# Patient Record
Sex: MALE | Race: WHITE | NOT HISPANIC OR LATINO | Employment: OTHER | URBAN - METROPOLITAN AREA
[De-identification: names, ages, dates, MRNs, and addresses within clinical notes are randomized per-mention and may not be internally consistent; named-entity substitution may affect disease eponyms.]

---

## 2018-12-27 ENCOUNTER — CONVERSION ENCOUNTER (OUTPATIENT)
Dept: FAMILY MEDICINE CLINIC | Facility: CLINIC | Age: 68
End: 2018-12-27

## 2018-12-27 ENCOUNTER — OFFICE VISIT CONVERTED (OUTPATIENT)
Dept: FAMILY MEDICINE CLINIC | Facility: CLINIC | Age: 68
End: 2018-12-27
Attending: NURSE PRACTITIONER

## 2019-04-09 ENCOUNTER — HOSPITAL ENCOUNTER (OUTPATIENT)
Dept: SLEEP MEDICINE | Facility: HOSPITAL | Age: 69
Discharge: HOME | End: 2019-04-09
Attending: PSYCHIATRY & NEUROLOGY

## 2019-05-29 ENCOUNTER — CONVERSION ENCOUNTER (OUTPATIENT)
Dept: FAMILY MEDICINE CLINIC | Facility: CLINIC | Age: 69
End: 2019-05-29

## 2019-05-29 ENCOUNTER — OFFICE VISIT CONVERTED (OUTPATIENT)
Dept: FAMILY MEDICINE CLINIC | Facility: CLINIC | Age: 69
End: 2019-05-29
Attending: FAMILY MEDICINE

## 2019-06-27 ENCOUNTER — HOSPITAL ENCOUNTER (OUTPATIENT)
Dept: OTHER | Facility: HOSPITAL | Age: 69
Discharge: HOME | End: 2019-06-27
Attending: FAMILY MEDICINE

## 2019-06-27 LAB
CHOLEST SERPL-MCNC: 183 MG/DL (ref 107–200)
CHOLEST/HDLC SERPL: 3.5 {RATIO} (ref 3–6)
HDLC SERPL-MCNC: 52 MG/DL (ref 40–60)
LDLC SERPL CALC-MCNC: 99 MG/DL (ref 70–100)
TRIGL SERPL-MCNC: 159 MG/DL (ref 40–150)
VLDLC SERPL-MCNC: 32 MG/DL (ref 5–37)

## 2019-06-28 LAB — HCV AB S/CO SERPL IA: 0.1 S/CO RATIO (ref 0–0.9)

## 2019-10-16 ENCOUNTER — OFFICE VISIT CONVERTED (OUTPATIENT)
Dept: FAMILY MEDICINE CLINIC | Facility: CLINIC | Age: 69
End: 2019-10-16
Attending: NURSE PRACTITIONER

## 2019-10-16 ENCOUNTER — CONVERSION ENCOUNTER (OUTPATIENT)
Dept: FAMILY MEDICINE CLINIC | Facility: CLINIC | Age: 69
End: 2019-10-16

## 2019-12-12 ENCOUNTER — CONVERSION ENCOUNTER (OUTPATIENT)
Dept: FAMILY MEDICINE CLINIC | Facility: CLINIC | Age: 69
End: 2019-12-12

## 2019-12-12 ENCOUNTER — OFFICE VISIT CONVERTED (OUTPATIENT)
Dept: FAMILY MEDICINE CLINIC | Facility: CLINIC | Age: 69
End: 2019-12-12
Attending: FAMILY MEDICINE

## 2020-06-17 ENCOUNTER — OFFICE VISIT CONVERTED (OUTPATIENT)
Dept: FAMILY MEDICINE CLINIC | Facility: CLINIC | Age: 70
End: 2020-06-17
Attending: FAMILY MEDICINE

## 2020-06-17 ENCOUNTER — CONVERSION ENCOUNTER (OUTPATIENT)
Dept: FAMILY MEDICINE CLINIC | Facility: CLINIC | Age: 70
End: 2020-06-17

## 2021-02-01 ENCOUNTER — CONVERSION ENCOUNTER (OUTPATIENT)
Dept: GASTROENTEROLOGY | Facility: CLINIC | Age: 71
End: 2021-02-01
Attending: INTERNAL MEDICINE

## 2021-03-12 ENCOUNTER — HOSPITAL ENCOUNTER (OUTPATIENT)
Dept: GASTROENTEROLOGY | Facility: HOSPITAL | Age: 71
Setting detail: HOSPITAL OUTPATIENT SURGERY
Discharge: HOME | End: 2021-03-12
Attending: INTERNAL MEDICINE

## 2021-04-20 ENCOUNTER — HOSPITAL ENCOUNTER (OUTPATIENT)
Dept: FAMILY MEDICINE CLINIC | Facility: CLINIC | Age: 71
Discharge: HOME | End: 2021-04-20
Attending: FAMILY MEDICINE

## 2021-04-20 ENCOUNTER — OFFICE VISIT CONVERTED (OUTPATIENT)
Dept: FAMILY MEDICINE CLINIC | Facility: CLINIC | Age: 71
End: 2021-04-20
Attending: FAMILY MEDICINE

## 2021-04-20 LAB
ALBUMIN SERPL-MCNC: 4.1 G/DL (ref 3.5–5)
ALBUMIN/GLOB SERPL: 1.4 {RATIO} (ref 1.4–2.6)
ALP SERPL-CCNC: 119 U/L (ref 56–155)
ALT SERPL-CCNC: 35 U/L (ref 10–40)
ANION GAP SERPL CALC-SCNC: 13 MMOL/L (ref 8–19)
AST SERPL-CCNC: 26 U/L (ref 15–50)
BILIRUB SERPL-MCNC: 0.24 MG/DL (ref 0.2–1.3)
BUN SERPL-MCNC: 16 MG/DL (ref 5–25)
BUN/CREAT SERPL: 14 {RATIO} (ref 6–20)
CALCIUM SERPL-MCNC: 9.6 MG/DL (ref 8.7–10.4)
CHLORIDE SERPL-SCNC: 104 MMOL/L (ref 99–111)
CONV CO2: 27 MMOL/L (ref 22–32)
CONV TOTAL PROTEIN: 7.1 G/DL (ref 6.3–8.2)
CREAT UR-MCNC: 1.12 MG/DL (ref 0.7–1.2)
GFR SERPLBLD BASED ON 1.73 SQ M-ARVRAT: >60 ML/MIN/{1.73_M2}
GLOBULIN UR ELPH-MCNC: 3 G/DL (ref 2–3.5)
GLUCOSE SERPL-MCNC: 136 MG/DL (ref 70–99)
OSMOLALITY SERPL CALC.SUM OF ELEC: 293 MOSM/KG (ref 273–304)
POTASSIUM SERPL-SCNC: 4.1 MMOL/L (ref 3.5–5.3)
SODIUM SERPL-SCNC: 140 MMOL/L (ref 135–147)

## 2021-05-10 NOTE — H&P
History and Physical      Patient Name: Reji Quintero   Patient ID: [id number]   Sex: Male   YOB: 1950    Primary Care Provider: Serg Johnson III MD    Visit Date: February 1, 2021    Provider: Gordon Gomez MD   Location: [facility]   Location Address: [address] [place] [address]  [id number]   Location Phone: [phone]          Chief Complaint  · Surgical History and Physical  · Screening Colonoscopy      History Of Present Illness  NON-INPATIENT HISTORY AND PHYSICAL  Allergies: tetracycline   Chief Complaint/History of Present Illness: Positive cologaurd, No family history of colon cancer   Colon Recall: No   Failed Outpatient Treatment/Contraindications: N/A   Current Medications: aripiprazole 10 mg oral tablet, atorvastatin 20 mg oral tablet, bupropion HCl 150 mg oral tablet extended release 24 hr, clobetasol 0.05 % topical cream, lamotrigine 150 mg oral tablet, NuLYTELY with Flavor Packs 420 gram oral recon soln, and paroxetine HCl 25 mg oral tablet extended release 24 hr   Significant Past Medical History: Allergic rhinitis, chronic, Anxiety and depression, Bipolar 2 disorder, Depression, High cholesterol, Medial meniscus tear, right, subsequent encounter, Medication management, and Primary osteoarthritis of right knee   Significant Family Medical History: No family history of colon cancer   Significant Past Surgical History: *Metal Implant, Colonoscopy, elbow, Kidney, and [place] Tooth Extraction   Previous Colonoscopy: Yes YEAR: 2016 By Whom: ALL CARE PROVIDER NAME   Previous EGD: No   PHYSICAL EXAM:  Heart: Regular Rate and Rhythm   Lungs: Breathing Unlabored           Assessment  · Preoperative examination     V72.84/Z01.818  · Screening for colon cancer     V76.51/Z12.11  · Positive colorectal cancer screening using DNA-based stool test     787.7/R19.5      Plan  · Orders  o Consent for Colonoscopy with Possible Biopsy - Possible risks/complications,  benefits, and alternatives to surgical or invasive procedure have been explained to patient and/or legal guardian. -Patient has been evaluated and can tolerate anesthesia and/or sedation. Risks, benefits, and alternatives to anesthesia and sedation have been explained to patient and/or legal guardian. (39363) - V72.84/Z01.818, V76.51/Z12.11, 787.7/R19.5 - 03/12/2021  · Medications  o Medications have been Reconciled  o Transition of Care or Provider Policy  · Instructions  o ****Surgical Orders****  o ***************  o Outpatient  o ***************  o RISK AND BENEFITS:  o Possible risks/complications, benefits and alternatives to surgical or invasive procedure have been explained to the patient and/or legal guardian.  o Patient has been evaluated and can tolerate anesthesia and/or sedation. Risks, benefits, and alternatives to anesthesia and sedation have been explained to the patient and/or legal guardian.  o ***************  o PREP: Per protocol  o IV: Per Anesthesia  o The above History and Physical Examination has been completed within 30 days of admission.  o This note has been transcribed by PARI Duke. I have read and agree with the findings in this note.  o Electronically Identified Patient Education Materials Provided Electronically  · Disposition  o Call or Return if symptoms worsen or persist.            Electronically Signed by: Darcie Ty MA -Author on February 1, 2021 08:53:22 AM

## 2021-05-13 NOTE — PROGRESS NOTES
Progress Note      Patient Name: Reji Quintero   Patient ID: [id number]   Sex: Male   YOB: 1950    Primary Care Provider: Serg Johnson III MD    Visit Date: June 17, 2020    Provider: Serg Johnson III MD   Location: [facility]   Location Address: [address] [place] [address]  [id number]   Location Phone: [phone]          Chief Complaint  · yearly routine check up and lab work  · refill medication for depression, cholesterol and bipolar  · Adult General Male Physical Exam      History Of Present Illness  Reji Quintero is a 70 year old /White male who presents for evaluation and treatment of:      HPI     patient 70-year-old  is here for his 6-month follow-up.  The patient states he is not having problems    history of hypercholesterolemia  history of bipolar   history of overweight    Review of systems     cardiovascular no chest pain no palpitations patient not exercising needs to.  Respiratory no shortness of breath dyspnea exertion   had a colonoscopy 2010 needs a colonoscopy but recommended Dr. Bill Saldivar thank you  Skin no lesions.  Neurologic no focal losses.    Physical exam    blood pressure is 110/70, weight 236, temperature 98  General no distress  Respiratory no increased work of breathing lungs clear and equal bilateral wheezes  Cardiovascular regular rhythm normal no murmur  Abdomen soft nontender no paraspinal megaly rectal exam shows 1+ prostate hypertrophy no lesions.  Skin is negative.    Assessment     #1 hypercholesterolemia is not taking atorvastatin for 4 weeks needs to take it for a month and then will get his blood work sent and his PSA in office blood   #2 patient needs a colonoscopy he will schedule   #3 bipolar patient is doing well sees a psychiatrist.    Plan     increase exercise decrease weight get the colonoscopy.  And get his blood work in one month       Past Medical History  Disease Name Date Onset Notes    Allergic rhinitis, chronic --  --    Anxiety and depression 12/12/2019 --    Bipolar 2 disorder 12/12/2019 --    Depression --  --    High cholesterol --  --    Medial meniscus tear, right, subsequent encounter 06/01/2016 --    Medication management 12/12/2019 --    Primary osteoarthritis of right knee 05/19/2016 --          Past Surgical History  Procedure Name Date Notes   *Metal Implant --  --    Colonoscopy 2016 normal due 2026   elbow 2009 right   Kidney --  --    [place] Tooth Extraction 2006, 1966 --          Medication List  Name Date Started Instructions   aripiprazole 10 mg oral tablet 06/17/2020 TAKE 1 TABLET EVERY DAY   atorvastatin 20 mg oral tablet 06/17/2020 TAKE 1 TABLET EVERY DAY   bupropion HCl 150 mg oral tablet extended release 24 hr 06/17/2020 TAKE 1 TABLET EVERY DAY   clobetasol 0.05 % topical cream 10/16/2019 apply a thin layer to the affected area(s) by topical route 3 times per day   lamotrigine 150 mg oral tablet 06/17/2020 TAKE 1 TABLET TWICE DAILY   paroxetine HCl 25 mg oral tablet extended release 24 hr 06/17/2020 TAKE 1 TABLET EVERY DAY         Allergy List  Allergen Name Date Reaction Notes   tetracycline --  --  --        Allergies Reconciled  Family Medical History  Disease Name Relative/Age Notes   Heart Disease Father/   --    Diabetes, unspecified type Mother/   Mother   Renal Calculus Father/   Father   -Father's Family History Unknown Father/   Father   -Mother's Family History Unknown Mother/   Mother         Social History  Finding Status Start/Stop Quantity Notes   Active but no formal exercise --  --/-- --  --    Advance directive declined by patient --  --/-- --  --    Alcohol Current some day 0/0 --  drinks monthly; wine, beer and liquor   Caffeine Current every day 0/0 --  drinks regularly; tea and soft drinks; 3-4 times per day   Second hand smoke exposure Never 0/0 --  no   Tobacco Never 0/0 --  never a smoker         Immunizations  NameDate Admin Mfg Trade Name Lot  "Number Route Inj VIS Given VIS Publication   Mjwfvwsgm64/12/2019 [facility] Fluzone Quadrivalent [id number] IM LD 12/12/2019    Comments: ndc [id number]   Yydhjrkaa14/04/2017 [facility] Fluzone Quadrivalent [id number] IM  12/04/2017 08/07/2015   Comments:    Prevnar 1312/04/2017 [facility] PREVNAR 13 [id number] IM  12/04/2017 12/04/2017   Comments:          Vitals  Date Time BP Position Site L\R Cuff Size HR RR TEMP (F) WT  HT  BMI kg/m2 BSA m2 O2 Sat HC       06/17/2020 02:15 /70 Sitting      98.4 236lbs 0oz 5'  11\" 32.91 2.32               Results  · In-Office Procedures  o Lab procedure  § IOP - Urinalysis without Microscopy (Clinitek) [facility] ([id number])   § Color Ur: Yellow   § Clarity Ur: Clear   § Glucose Ur Ql Strip: Negative   § Bilirub Ur Ql Strip: Negative   § Ketones Ur Ql Strip: Negative   § Sp Gr Ur Qn: 1.025   § Hgb Ur Ql Strip: Negative   § pH Ur-LsCnc: 5.0   § Prot Ur Ql Strip: Negative   § Urobilinogen Ur Strip-mCnc: 0.2 E.U./dL   § Nitrite Ur Ql Strip: Negative   § WBC Est Ur Ql Strip: Negative       Assessment  · Screening for depression     V79.0/Z13.89  · Screening for colon cancer     V76.51/Z12.11  · Screening for prostate cancer     V76.44/Z12.5  · Encounter for screening for cardiovascular disorders     V81.2/Z13.6  · Fatigue     780.79/R53.83  · Anxiety and depression       Anxiety disorder, unspecified     300.00/F41.9  Major depressive disorder, single episode, unspecified     300.00/F32.9  · Bipolar 2 disorder     296.89/F31.81  · Medication management     V58.69/Z79.899  · Primary osteoarthritis of right knee     715.16/M17.11  · High cholesterol     272.0/E78.0  · General Medical Exam, Adult (CPE)     V70.0/Z00.00    Problems Reconciled  Plan  · Orders  o COLONOSCOPY REFERRAL (COLON) - V76.51/Z12.11 - 06/17/2020   last colonoscopy 7/15/2010 normal due 7/2020  o ACO - Pt declines to or was not able to provide an Advance Care Plan or name a Surrogate Decision Maker (1124F) - - 06/17/2020  o Male Physical Primary Care " Panel (CMP, CBC, TSH, Lipid, PSA) [facility] ([id number], 07766, 87518, 54613, ) - V58.69/Z79.899, V81.2/Z13.6, V76.44/Z12.5, 780.79/R53.83 - 07/17/2020  o ACO-39: Current medications updated and reviewed () - - 06/17/2020  o ACO-15: Pneumococcal Vaccine Administered or Previously Received (4040F) - - 06/17/2020  o ACO-14: Influenza immunization administered or previously received () - - 06/17/2020  o ACO-13: Fall Risk Screening with no falls in past year or only one fall without injury in the past year (1101F) - - 06/17/2020  · Medications  o aripiprazole 10 mg oral tablet   SIG: TAKE 1 TABLET EVERY DAY   DISP: (90) Tablet with 3 refills  Refilled on 06/17/2020     o atorvastatin 20 mg oral tablet   SIG: TAKE 1 TABLET EVERY DAY   DISP: (90) Tablet with 3 refills  Refilled on 06/17/2020     o bupropion HCl 150 mg oral tablet extended release 24 hr   SIG: TAKE 1 TABLET EVERY DAY   DISP: (90) Tablet with 3 refills  Refilled on 06/17/2020     o lamotrigine 150 mg oral tablet   SIG: TAKE 1 TABLET TWICE DAILY   DISP: (180) Tablet with 3 refills  Refilled on 06/17/2020     o paroxetine HCl 25 mg oral tablet extended release 24 hr   SIG: TAKE 1 TABLET EVERY DAY   DISP: (90) Tablet with 3 refills  Refilled on 06/17/2020     o Vitamin D2 50,000 unit oral capsule   SIG: take 1 capsule (50,000 unit) by oral route once weekly   DISP: (12) capsules with 0 refills  Discontinued on 06/17/2020     o Medications have been Reconciled  o Transition of Care or Provider Policy  · Instructions  o Depression Screen completed and scanned into the EMR under the designated folder within the patient's documents.  o Today's PHQ-9 result is _4__  o CMS (Medicare) estimates that one in six persons older than 65 suffers from depression and that depression in older patients occurs in 25% of those with other medical illnesses. US Preventative Services Task Force indicates that depression screening and support improves clinical outcomes in older  adults. This service is covered by Medicare once a year as part of helping maintain a healthy you!  o The provider screening met the required time of 15 minutes.  o Patient is taking medications as prescribed and doing well.   o Take all medications as prescribed/directed.  o Patient instructed/educated on their diet and exercise program.  o Patient was educated/instructed on their diagnosis, treatment and medications prior to discharge from the clinic today.  o Bring all medicines with their bottles to each office visit.  o Time spent with the patient was 30 minutes, more than 50% face to face.  o Chronic conditions reviewed and taken into consideration for today's treatment plan.  o Discussed Covid-19 precautions including, but not limited to, social distancing, avoid touching your face, and hand washing.             Electronically Signed by: Emelina Field, -Author on June 17, 2020 03:14:05 PM  Electronically Co-signed by: Serg Johnson III MD -Reviewer on June 17, 2020 05:27:16 PM

## 2021-05-14 VITALS
HEART RATE: 72 BPM | OXYGEN SATURATION: 94 % | WEIGHT: 236 LBS | HEIGHT: 71 IN | TEMPERATURE: 97.9 F | SYSTOLIC BLOOD PRESSURE: 130 MMHG | DIASTOLIC BLOOD PRESSURE: 70 MMHG | BODY MASS INDEX: 33.04 KG/M2
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
## 2021-05-15 VITALS
TEMPERATURE: 98.4 F | BODY MASS INDEX: 33.04 KG/M2 | SYSTOLIC BLOOD PRESSURE: 110 MMHG | DIASTOLIC BLOOD PRESSURE: 70 MMHG | WEIGHT: 236 LBS | HEIGHT: 71 IN

## 2021-05-15 VITALS
HEART RATE: 56 BPM | WEIGHT: 230 LBS | SYSTOLIC BLOOD PRESSURE: 132 MMHG | DIASTOLIC BLOOD PRESSURE: 77 MMHG | HEIGHT: 71 IN | BODY MASS INDEX: 32.2 KG/M2 | OXYGEN SATURATION: 97 %

## 2021-05-15 VITALS
DIASTOLIC BLOOD PRESSURE: 68 MMHG | HEIGHT: 71 IN | BODY MASS INDEX: 32.2 KG/M2 | SYSTOLIC BLOOD PRESSURE: 118 MMHG | WEIGHT: 230 LBS

## 2021-05-15 VITALS
BODY MASS INDEX: 33.6 KG/M2 | SYSTOLIC BLOOD PRESSURE: 130 MMHG | WEIGHT: 240 LBS | DIASTOLIC BLOOD PRESSURE: 60 MMHG | HEIGHT: 71 IN

## 2021-05-16 VITALS
RESPIRATION RATE: 16 BRPM | SYSTOLIC BLOOD PRESSURE: 132 MMHG | HEIGHT: 71 IN | BODY MASS INDEX: 32.76 KG/M2 | TEMPERATURE: 97.5 F | WEIGHT: 234 LBS | OXYGEN SATURATION: 97 % | HEART RATE: 64 BPM | DIASTOLIC BLOOD PRESSURE: 68 MMHG

## 2021-07-08 RX ORDER — PAROXETINE HYDROCHLORIDE HEMIHYDRATE 25 MG/1
TABLET, FILM COATED, EXTENDED RELEASE ORAL
Qty: 180 TABLET | Refills: 0 | Status: SHIPPED | OUTPATIENT
Start: 2021-07-08 | End: 2021-09-27

## 2021-07-14 ENCOUNTER — OFFICE VISIT (OUTPATIENT)
Dept: FAMILY MEDICINE CLINIC | Facility: CLINIC | Age: 71
End: 2021-07-14

## 2021-07-14 VITALS
WEIGHT: 224 LBS | DIASTOLIC BLOOD PRESSURE: 70 MMHG | HEART RATE: 73 BPM | TEMPERATURE: 98.1 F | HEIGHT: 71 IN | OXYGEN SATURATION: 95 % | BODY MASS INDEX: 31.36 KG/M2 | SYSTOLIC BLOOD PRESSURE: 98 MMHG

## 2021-07-14 DIAGNOSIS — F33.1 MAJOR DEPRESSIVE DISORDER, RECURRENT, MODERATE (HCC): ICD-10-CM

## 2021-07-14 DIAGNOSIS — Z79.899 MEDICATION MANAGEMENT: ICD-10-CM

## 2021-07-14 DIAGNOSIS — F32.A ANXIETY AND DEPRESSION: ICD-10-CM

## 2021-07-14 DIAGNOSIS — R40.0 SLEEPINESS: ICD-10-CM

## 2021-07-14 DIAGNOSIS — R26.9 GAIT ABNORMALITY: ICD-10-CM

## 2021-07-14 DIAGNOSIS — Z13.6 ENCOUNTER FOR LIPID SCREENING FOR CARDIOVASCULAR DISEASE: ICD-10-CM

## 2021-07-14 DIAGNOSIS — F41.9 ANXIETY AND DEPRESSION: ICD-10-CM

## 2021-07-14 DIAGNOSIS — E78.00 HIGH CHOLESTEROL: ICD-10-CM

## 2021-07-14 DIAGNOSIS — Z13.220 ENCOUNTER FOR LIPID SCREENING FOR CARDIOVASCULAR DISEASE: ICD-10-CM

## 2021-07-14 DIAGNOSIS — M19.91 PRIMARY LOCALIZED OSTEOARTHRITIS: ICD-10-CM

## 2021-07-14 DIAGNOSIS — R53.83 FATIGUE, UNSPECIFIED TYPE: ICD-10-CM

## 2021-07-14 DIAGNOSIS — M25.559 HIP PAIN: ICD-10-CM

## 2021-07-14 DIAGNOSIS — F31.81 BIPOLAR 2 DISORDER (HCC): ICD-10-CM

## 2021-07-14 DIAGNOSIS — Z00.00 ANNUAL PHYSICAL EXAM: Primary | ICD-10-CM

## 2021-07-14 DIAGNOSIS — Z12.5 SCREENING FOR PROSTATE CANCER: ICD-10-CM

## 2021-07-14 LAB
ALBUMIN SERPL-MCNC: 4.6 G/DL (ref 3.5–5.2)
ALBUMIN/GLOB SERPL: 1.8 G/DL
ALP SERPL-CCNC: 118 U/L (ref 39–117)
ALT SERPL W P-5'-P-CCNC: 35 U/L (ref 1–41)
ANION GAP SERPL CALCULATED.3IONS-SCNC: 11.8 MMOL/L (ref 5–15)
AST SERPL-CCNC: 31 U/L (ref 1–40)
BASOPHILS # BLD AUTO: 0.07 10*3/MM3 (ref 0–0.2)
BASOPHILS NFR BLD AUTO: 0.6 % (ref 0–1.5)
BILIRUB SERPL-MCNC: 0.4 MG/DL (ref 0–1.2)
BUN SERPL-MCNC: 22 MG/DL (ref 8–23)
BUN/CREAT SERPL: 14 (ref 7–25)
CALCIUM SPEC-SCNC: 9.7 MG/DL (ref 8.6–10.5)
CHLORIDE SERPL-SCNC: 102 MMOL/L (ref 98–107)
CHOLEST SERPL-MCNC: 151 MG/DL (ref 0–200)
CO2 SERPL-SCNC: 25.2 MMOL/L (ref 22–29)
CREAT SERPL-MCNC: 1.57 MG/DL (ref 0.76–1.27)
DEPRECATED RDW RBC AUTO: 45.3 FL (ref 37–54)
EOSINOPHIL # BLD AUTO: 0.09 10*3/MM3 (ref 0–0.4)
EOSINOPHIL NFR BLD AUTO: 0.8 % (ref 0.3–6.2)
ERYTHROCYTE [DISTWIDTH] IN BLOOD BY AUTOMATED COUNT: 12.6 % (ref 12.3–15.4)
GFR SERPL CREATININE-BSD FRML MDRD: 44 ML/MIN/1.73
GLOBULIN UR ELPH-MCNC: 2.6 GM/DL
GLUCOSE SERPL-MCNC: 111 MG/DL (ref 65–99)
HCT VFR BLD AUTO: 44.6 % (ref 37.5–51)
HDLC SERPL-MCNC: 49 MG/DL (ref 40–60)
HGB BLD-MCNC: 14.6 G/DL (ref 13–17.7)
IMM GRANULOCYTES # BLD AUTO: 0.08 10*3/MM3 (ref 0–0.05)
IMM GRANULOCYTES NFR BLD AUTO: 0.7 % (ref 0–0.5)
LDLC SERPL CALC-MCNC: 75 MG/DL (ref 0–100)
LDLC/HDLC SERPL: 1.44 {RATIO}
LYMPHOCYTES # BLD AUTO: 2.15 10*3/MM3 (ref 0.7–3.1)
LYMPHOCYTES NFR BLD AUTO: 18.7 % (ref 19.6–45.3)
MCH RBC QN AUTO: 31.9 PG (ref 26.6–33)
MCHC RBC AUTO-ENTMCNC: 32.7 G/DL (ref 31.5–35.7)
MCV RBC AUTO: 97.6 FL (ref 79–97)
MONOCYTES # BLD AUTO: 1.08 10*3/MM3 (ref 0.1–0.9)
MONOCYTES NFR BLD AUTO: 9.4 % (ref 5–12)
NEUTROPHILS NFR BLD AUTO: 69.8 % (ref 42.7–76)
NEUTROPHILS NFR BLD AUTO: 8.05 10*3/MM3 (ref 1.7–7)
NRBC BLD AUTO-RTO: 0 /100 WBC (ref 0–0.2)
PLATELET # BLD AUTO: 206 10*3/MM3 (ref 140–450)
PMV BLD AUTO: 10.4 FL (ref 6–12)
POTASSIUM SERPL-SCNC: 4.2 MMOL/L (ref 3.5–5.2)
PROT SERPL-MCNC: 7.2 G/DL (ref 6–8.5)
RBC # BLD AUTO: 4.57 10*6/MM3 (ref 4.14–5.8)
SODIUM SERPL-SCNC: 139 MMOL/L (ref 136–145)
TRIGL SERPL-MCNC: 158 MG/DL (ref 0–150)
TSH SERPL DL<=0.05 MIU/L-ACNC: 1.93 UIU/ML (ref 0.27–4.2)
VLDLC SERPL-MCNC: 27 MG/DL (ref 5–40)
WBC # BLD AUTO: 11.52 10*3/MM3 (ref 3.4–10.8)

## 2021-07-14 PROCEDURE — 80061 LIPID PANEL: CPT | Performed by: FAMILY MEDICINE

## 2021-07-14 PROCEDURE — 36415 COLL VENOUS BLD VENIPUNCTURE: CPT | Performed by: FAMILY MEDICINE

## 2021-07-14 PROCEDURE — 84443 ASSAY THYROID STIM HORMONE: CPT | Performed by: FAMILY MEDICINE

## 2021-07-14 PROCEDURE — 85025 COMPLETE CBC W/AUTO DIFF WBC: CPT | Performed by: FAMILY MEDICINE

## 2021-07-14 PROCEDURE — 82607 VITAMIN B-12: CPT | Performed by: FAMILY MEDICINE

## 2021-07-14 PROCEDURE — 80053 COMPREHEN METABOLIC PANEL: CPT | Performed by: FAMILY MEDICINE

## 2021-07-14 PROCEDURE — G0103 PSA SCREENING: HCPCS | Performed by: FAMILY MEDICINE

## 2021-07-14 PROCEDURE — 99214 OFFICE O/P EST MOD 30 MIN: CPT | Performed by: FAMILY MEDICINE

## 2021-07-14 PROCEDURE — 82746 ASSAY OF FOLIC ACID SERUM: CPT | Performed by: FAMILY MEDICINE

## 2021-07-14 RX ORDER — LAMOTRIGINE 150 MG/1
150 TABLET ORAL 2 TIMES DAILY
COMMUNITY
End: 2022-03-23 | Stop reason: SDUPTHER

## 2021-07-14 RX ORDER — BUPROPION HYDROCHLORIDE 300 MG/1
300 TABLET ORAL DAILY
COMMUNITY
Start: 2021-04-23 | End: 2021-07-14

## 2021-07-14 RX ORDER — ARIPIPRAZOLE 10 MG/1
10 TABLET ORAL DAILY
COMMUNITY
End: 2021-09-01 | Stop reason: SDUPTHER

## 2021-07-14 RX ORDER — ATORVASTATIN CALCIUM 20 MG/1
20 TABLET, FILM COATED ORAL DAILY
COMMUNITY
End: 2021-09-01 | Stop reason: SDUPTHER

## 2021-07-14 RX ORDER — BUPROPION HYDROCHLORIDE 300 MG/1
300 TABLET ORAL 2 TIMES DAILY
COMMUNITY
End: 2021-07-14 | Stop reason: SDUPTHER

## 2021-07-14 RX ORDER — BUPROPION HYDROCHLORIDE 300 MG/1
300 TABLET ORAL DAILY
Qty: 90 TABLET | Refills: 3 | Status: SHIPPED | OUTPATIENT
Start: 2021-07-14 | End: 2021-07-22

## 2021-07-14 RX ORDER — BUPROPION HYDROCHLORIDE 300 MG/1
300 TABLET ORAL 2 TIMES DAILY
Qty: 180 TABLET | Refills: 3 | Status: CANCELLED | OUTPATIENT
Start: 2021-07-14

## 2021-07-14 NOTE — PROGRESS NOTES
Chief Complaint  Annual Exam, Tremors (both hands X 3-4 months, not every day sometimes worse than others), Hip Pain (right hip pain X 2 months no known injury, comes and goes), Fatigue (decreased stamina ), Med Refill, and Gait Problem (stumbles/shuffles)    Subjective     {Problem List  Visit Diagnosis   Encounters  Notes  Medications  Labs  Result Review Imaging  Media :23}     Reji Enriquez presents to [facility]  History of Present Illness  He 71-year-old history of hypercholesterolemia arthritis of the medial meniscus bipolar for his physical exam    Allergies   Allergen Reactions   • Tetracyclines & Related Rash        Past Surgical History:   • COLONOSCOPY    2016   • ELBOW PROCEDURE   • FRACTURE SURGERY   • KIDNEY SURGERY   • OTHER SURGICAL HISTORY    Metal Implant   • VASECTOMY   • [place] TOOTH EXTRACTION    1996       Social History     Tobacco Use   • Smoking status: Never Smoker   • Smokeless tobacco: Never Used   Substance Use Topics   • Alcohol use: Yes     Alcohol/week: 2.0 standard drinks     Types: 1 Glasses of wine, 1 Shots of liquor per week     Comment: Drinks monthly; wine, beer and liquor       Family History   Problem Relation Age of Onset   • Diabetes Mother         [place] name   • Heart disease Father    • Urolithiasis Father    • Depression Maternal Grandfather    • Mental illness Sister         Bipolar        Health Maintenance Due   Topic Date Due   • ANNUAL PHYSICAL  Never done   • TDAP/TD VACCINES (1 - Tdap) Never done   • ZOSTER VACCINE (1 of 2) Never done   • Pneumococcal Vaccine 65+ (2 of 2 - PPSV23) 12/04/2018   • LIPID PANEL  07/08/2021      Last Completed Colonoscopy     This patient has no relevant Health Maintenance data.          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  Discussed flexes but discussed not sitting so much.  Maryse continuing his walking  Objective     Vitals:    07/14/21 1631   BP: 98/70   Pulse: 73   Temp: 98.1 °F (36.7 °C)   SpO2: 95%   Weight: 102 kg (224 lb)   Height: 180.3 cm (71\")     Body mass index is 31.24 kg/m².      Physical Exam  General no distress patient looks better than he did the last time looks like appears if he is exercising new line HEENT sclera conjunctiva Clear.  TMs are negative.  No oral lesions.  Neck no adenopathy no thyromegaly  Respiratory lungs clear and equal bilaterally no rales no rhonchi no wheezes  Cardiovascular regular rhythm no murmur  Abdomen soft nontender no hepatosplenomegaly  Genitalia testicles are normal no hernia penis is normal  Rectal 1+ prostate hypertrophy no asymmetry no nodules  Skin is negative no lesions  Neurologic mentation is normal speech is normal gait is normal  Musculoskeletal knee show no crepitations hips are good internal and external rotation  Adenopathy no cervical axillary or groin adenopathy  Result Review :              Assessment and Plan    Musculoskeletal low back pain bipolar 2 stable weight needs to lose weight continue to exercise patient did lose 6 pounds status post hyperparathyroidism parathyroidectomy we will check her calcium does not do a colonoscopy till 2031                Patient was given instructions and counseling regarding his condition or for health maintenance advice. Please see specific information pulled into the AVS if appropriate.     "

## 2021-07-15 LAB
FOLATE SERPL-MCNC: 14.2 NG/ML (ref 4.78–24.2)
PSA SERPL-MCNC: 1.27 NG/ML (ref 0–4)
VIT B12 BLD-MCNC: 441 PG/ML (ref 211–946)

## 2021-07-22 RX ORDER — BUPROPION HYDROCHLORIDE 300 MG/1
TABLET ORAL
Qty: 90 TABLET | Refills: 3 | Status: SHIPPED | OUTPATIENT
Start: 2021-07-22 | End: 2022-09-22

## 2021-09-01 RX ORDER — ATORVASTATIN CALCIUM 20 MG/1
20 TABLET, FILM COATED ORAL DAILY
Qty: 90 TABLET | Refills: 3 | Status: SHIPPED | OUTPATIENT
Start: 2021-09-01

## 2021-09-01 RX ORDER — ARIPIPRAZOLE 10 MG/1
20 TABLET ORAL DAILY
Qty: 90 TABLET | Refills: 0 | Status: SHIPPED | OUTPATIENT
Start: 2021-09-01 | End: 2021-10-13

## 2021-09-27 RX ORDER — PAROXETINE HYDROCHLORIDE HEMIHYDRATE 25 MG/1
TABLET, FILM COATED, EXTENDED RELEASE ORAL
Qty: 180 TABLET | Refills: 0 | Status: SHIPPED | OUTPATIENT
Start: 2021-09-27 | End: 2021-12-09

## 2021-10-13 RX ORDER — ARIPIPRAZOLE 10 MG/1
TABLET ORAL
Qty: 180 TABLET | Refills: 0 | Status: SHIPPED | OUTPATIENT
Start: 2021-10-13 | End: 2022-03-23 | Stop reason: SDUPTHER

## 2021-12-09 RX ORDER — PAROXETINE HYDROCHLORIDE HEMIHYDRATE 25 MG/1
TABLET, FILM COATED, EXTENDED RELEASE ORAL
Qty: 180 TABLET | Refills: 1 | Status: SHIPPED | OUTPATIENT
Start: 2021-12-09 | End: 2022-03-23 | Stop reason: SDUPTHER

## 2022-03-23 DIAGNOSIS — F32.A ANXIETY AND DEPRESSION: ICD-10-CM

## 2022-03-23 DIAGNOSIS — F41.9 ANXIETY AND DEPRESSION: ICD-10-CM

## 2022-03-23 DIAGNOSIS — F31.81 BIPOLAR 2 DISORDER: Primary | ICD-10-CM

## 2022-03-23 RX ORDER — PAROXETINE HYDROCHLORIDE HEMIHYDRATE 25 MG/1
50 TABLET, FILM COATED, EXTENDED RELEASE ORAL DAILY
Qty: 180 TABLET | Refills: 0 | Status: SHIPPED | OUTPATIENT
Start: 2022-03-23 | End: 2023-01-12

## 2022-03-23 RX ORDER — LAMOTRIGINE 150 MG/1
150 TABLET ORAL 2 TIMES DAILY
Qty: 180 TABLET | Refills: 0 | Status: SHIPPED | OUTPATIENT
Start: 2022-03-23 | End: 2022-07-18

## 2022-03-23 RX ORDER — ARIPIPRAZOLE 10 MG/1
20 TABLET ORAL DAILY
Qty: 180 TABLET | Refills: 0 | Status: SHIPPED | OUTPATIENT
Start: 2022-03-23 | End: 2022-06-09

## 2022-04-26 ENCOUNTER — HOSPITAL ENCOUNTER (OUTPATIENT)
Dept: GENERAL RADIOLOGY | Facility: HOSPITAL | Age: 72
Discharge: HOME | End: 2022-04-26
Admitting: FAMILY MEDICINE

## 2022-04-26 ENCOUNTER — OFFICE VISIT (OUTPATIENT)
Dept: FAMILY MEDICINE CLINIC | Facility: CLINIC | Age: 72
End: 2022-04-26

## 2022-04-26 VITALS
SYSTOLIC BLOOD PRESSURE: 136 MMHG | HEIGHT: 71 IN | DIASTOLIC BLOOD PRESSURE: 68 MMHG | HEART RATE: 66 BPM | OXYGEN SATURATION: 98 % | BODY MASS INDEX: 31.64 KG/M2 | WEIGHT: 226 LBS | TEMPERATURE: 97.4 F

## 2022-04-26 DIAGNOSIS — M54.50 CHRONIC RIGHT-SIDED LOW BACK PAIN WITHOUT SCIATICA: ICD-10-CM

## 2022-04-26 DIAGNOSIS — G89.29 CHRONIC RIGHT-SIDED LOW BACK PAIN WITHOUT SCIATICA: ICD-10-CM

## 2022-04-26 DIAGNOSIS — G89.29 CHRONIC RIGHT-SIDED LOW BACK PAIN WITHOUT SCIATICA: Primary | ICD-10-CM

## 2022-04-26 DIAGNOSIS — M54.50 CHRONIC RIGHT-SIDED LOW BACK PAIN WITHOUT SCIATICA: Primary | ICD-10-CM

## 2022-04-26 PROCEDURE — 72110 X-RAY EXAM L-2 SPINE 4/>VWS: CPT

## 2022-04-26 PROCEDURE — 99213 OFFICE O/P EST LOW 20 MIN: CPT | Performed by: FAMILY MEDICINE

## 2022-04-26 NOTE — PROGRESS NOTES
Chief Complaint  Back Pain (Right sided low back pain,  No radiation) and travel advise (Going to be traveling through [place] by railway.   he will be on the train for a month. 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    PAST MEDICAL HISTORY  Allergies   Allergen Reactions   • Tetracyclines & Related Rash        Past Surgical History:   • COLONOSCOPY    2016   • ELBOW PROCEDURE   • FRACTURE SURGERY   • KIDNEY SURGERY   • OTHER SURGICAL HISTORY    Metal Implant   • VASECTOMY   • WISDOM TOOTH EXTRACTION    1996       Social History     Tobacco Use   • Smoking status: Never Smoker   • Smokeless tobacco: Never Used   Substance Use Topics   • Alcohol use: Yes     Alcohol/week: 2.0 standard drinks     Types: 1 Glasses of wine, 1 Shots of liquor per week     Comment: Drinks monthly; wine, beer and liquor       Family History   Problem Relation Age of Onset   • Diabetes Mother         [place] name   • Heart disease Father    • Urolithiasis Father    • Depression Maternal Grandfather    • Mental illness Sister         Bipolar        Health Maintenance Due   Topic Date Due   • TDAP/TD VACCINES (1 - Tdap) Never done   • ZOSTER VACCINE (1 of 2) Never done   • Pneumococcal Vaccine 65+ (2 - PPSV23 or PCV20) 12/04/2018   • ANNUAL WELLNESS VISIT  Never done        Last Completed Colonoscopy          COLORECTAL CANCER SCREENING (COLONOSCOPY - Every 5 Years) Next due on 3/12/2026    04/22/2016  Outside Procedure: [place] COLONOSCOPY FLX DX W/COLLJ SPEC WHEN PFRMD                REVIEW OF SYSTEMS    Cardiovascular no chest pain no palpitations  Respiratory no shortness of breath no dyspnea exertion  Musculoskeletal pain just in the right low back  "does not go down the leg somewhat worse the more he walks    OBJECTIVE  Vitals:    04/26/22 1445   BP: 136/68   Pulse: 66   Temp: 97.4 °F (36.3 °C)   SpO2: 98%   Weight: 103 kg (226 lb)   Height: 180.3 cm (71\")     Body mass index is 31.52 kg/m².    PHYSICAL EXAM    General no distress  Cardiovascular regular rhythm no murmur  Respiratory lungs clear and equal bilaterally  Musculoskeletal negative straight leg raise on the right possible positive cross straight leg raise from the left good internal and external rotation of the hips    ASSESSMENT & PLAN  Diagnoses and all orders for this visit:    1. Chronic right-sided low back pain without sciatica (Primary)  -     XR Spine Lumbar Complete 4+VW; Future          Right-sided low back pain with a positive cross straight rate leg raise we will get a lumbosacral spine film to rule out anything else unusual take Tylenol arthritis 652 twice daily check in October            Patient was given instructions and counseling regarding his condition or for health maintenance advice. Please see specific information pulled into the AVS if appropriate.   "

## 2022-06-09 DIAGNOSIS — F31.81 BIPOLAR 2 DISORDER: ICD-10-CM

## 2022-06-09 RX ORDER — ARIPIPRAZOLE 10 MG/1
TABLET ORAL
Qty: 180 TABLET | Refills: 0 | Status: SHIPPED | OUTPATIENT
Start: 2022-06-09 | End: 2022-11-03

## 2022-06-22 RX ORDER — ATORVASTATIN CALCIUM 20 MG/1
TABLET, FILM COATED ORAL
Qty: 90 TABLET | Refills: 3 | OUTPATIENT
Start: 2022-06-22

## 2022-07-18 ENCOUNTER — OFFICE VISIT (OUTPATIENT)
Dept: FAMILY MEDICINE CLINIC | Facility: CLINIC | Age: 72
End: 2022-07-18

## 2022-07-18 VITALS
BODY MASS INDEX: 31.5 KG/M2 | WEIGHT: 225 LBS | TEMPERATURE: 97.7 F | HEIGHT: 71 IN | HEART RATE: 75 BPM | OXYGEN SATURATION: 97 % | DIASTOLIC BLOOD PRESSURE: 60 MMHG | SYSTOLIC BLOOD PRESSURE: 120 MMHG

## 2022-07-18 DIAGNOSIS — F32.A ANXIETY AND DEPRESSION: ICD-10-CM

## 2022-07-18 DIAGNOSIS — Z00.00 LABORATORY EXAM ORDERED AS PART OF ROUTINE GENERAL MEDICAL EXAMINATION: ICD-10-CM

## 2022-07-18 DIAGNOSIS — Z13.6 ENCOUNTER FOR LIPID SCREENING FOR CARDIOVASCULAR DISEASE: ICD-10-CM

## 2022-07-18 DIAGNOSIS — E78.00 HIGH CHOLESTEROL: ICD-10-CM

## 2022-07-18 DIAGNOSIS — F31.81 BIPOLAR 2 DISORDER: Primary | ICD-10-CM

## 2022-07-18 DIAGNOSIS — F31.81 BIPOLAR 2 DISORDER: ICD-10-CM

## 2022-07-18 DIAGNOSIS — Z13.220 ENCOUNTER FOR LIPID SCREENING FOR CARDIOVASCULAR DISEASE: ICD-10-CM

## 2022-07-18 DIAGNOSIS — Z79.899 MEDICATION MANAGEMENT: ICD-10-CM

## 2022-07-18 DIAGNOSIS — Z12.5 PROSTATE CANCER SCREENING: ICD-10-CM

## 2022-07-18 DIAGNOSIS — F41.9 ANXIETY AND DEPRESSION: ICD-10-CM

## 2022-07-18 DIAGNOSIS — Z00.00 MEDICARE ANNUAL WELLNESS VISIT, SUBSEQUENT: ICD-10-CM

## 2022-07-18 PROBLEM — D36.9 TUBULAR ADENOMA: Status: ACTIVE | Noted: 2022-07-18

## 2022-07-18 LAB
BILIRUB BLD-MCNC: ABNORMAL MG/DL
CLARITY, POC: CLEAR
COLOR UR: ABNORMAL
EXPIRATION DATE: ABNORMAL
GLUCOSE UR STRIP-MCNC: NEGATIVE MG/DL
KETONES UR QL: NEGATIVE
LEUKOCYTE EST, POC: NEGATIVE
Lab: ABNORMAL
NITRITE UR-MCNC: NEGATIVE MG/ML
PH UR: 5 [PH] (ref 5–8)
PROT UR STRIP-MCNC: NEGATIVE MG/DL
RBC # UR STRIP: NEGATIVE /UL
SP GR UR: 1.03 (ref 1–1.03)
UROBILINOGEN UR QL: NORMAL

## 2022-07-18 PROCEDURE — G0439 PPPS, SUBSEQ VISIT: HCPCS | Performed by: FAMILY MEDICINE

## 2022-07-18 PROCEDURE — 1159F MED LIST DOCD IN RCRD: CPT | Performed by: FAMILY MEDICINE

## 2022-07-18 PROCEDURE — 96160 PT-FOCUSED HLTH RISK ASSMT: CPT | Performed by: FAMILY MEDICINE

## 2022-07-18 PROCEDURE — 1170F FXNL STATUS ASSESSED: CPT | Performed by: FAMILY MEDICINE

## 2022-07-18 PROCEDURE — 81003 URINALYSIS AUTO W/O SCOPE: CPT | Performed by: FAMILY MEDICINE

## 2022-07-18 RX ORDER — LAMOTRIGINE 150 MG/1
TABLET ORAL
Qty: 180 TABLET | Refills: 0 | Status: SHIPPED | OUTPATIENT
Start: 2022-07-18 | End: 2023-02-27

## 2022-07-18 NOTE — PROGRESS NOTES
The ABCs of the Annual Wellness Visit  Initial Medicare Wellness Visit    Subjective   History of Present Illness:  Reji Enriquez is a 72 y.o. male who presents for an Initial Medicare Wellness Visit.    The following portions of the patient's history were reviewed and   updated as appropriate:   He  has a past medical history of Acute medial meniscus tear, right, subsequent encounter (06/01/2016), Allergic (Tetracycline), Anxiety and depression (12/12/2019), Bipolar 2 disorder (HCC) (12/12/2019), Cataract (03-21), Chronic allergic rhinitis, Depression, High cholesterol, History of medical problems (Parathyroidectomy), Hyperlipidemia, Medication management (12/12/2019), Myocardial infarction (HCC), and Primary osteoarthritis of right knee (05/19/2016).  He does not have any pertinent problems on file.  He  has a past surgical history that includes Other surgical history; Colonoscopy (2021); Elbow surgery (Right, 2009); Kidney surgery; [place] tooth extraction (2006); Fracture surgery (07-08); and Vasectomy (1989).  His family history includes Depression in his maternal grandfather; Diabetes in his mother; Heart disease in his father; Mental illness in his sister; Urolithiasis in his father.  He  reports that he has never smoked. He has never used smokeless tobacco. He reports current alcohol use of about 2.0 standard drinks of alcohol per week. He reports that he does not use drugs.  Current Outpatient Medications   Medication Sig Dispense Refill   • ARIPiprazole (ABILIFY) 10 MG tablet TAKE 2 TABLETS EVERY  tablet 0   • atorvastatin (LIPITOR) 20 MG tablet Take 1 tablet by mouth Daily. 90 tablet 3   • buPROPion XL (WELLBUTRIN XL) 300 MG 24 hr tablet TAKE 1 TABLET EVERY DAY (NEW DOSAGE)  90 tablet 3   • lamoTRIgine (LaMICtal) 150 MG tablet TAKE 1 TABLET TWICE DAILY 180 tablet 0   • PARoxetine CR (PAXIL-CR) 25 MG 24 hr tablet Take 2 tablets by mouth Daily. 180 tablet 0     No current facility-administered  medications for this visit.     Current Outpatient Medications on File Prior to Visit   Medication Sig   • ARIPiprazole (ABILIFY) 10 MG tablet TAKE 2 TABLETS EVERY DAY   • atorvastatin (LIPITOR) 20 MG tablet Take 1 tablet by mouth Daily.   • buPROPion XL (WELLBUTRIN XL) 300 MG 24 hr tablet TAKE 1 TABLET EVERY DAY (NEW DOSAGE)    • PARoxetine CR (PAXIL-CR) 25 MG 24 hr tablet Take 2 tablets by mouth Daily.   • [DISCONTINUED] lamoTRIgine (LaMICtal) 150 MG tablet Take 1 tablet by mouth 2 (Two) Times a Day.     No current facility-administered medications on file prior to visit.     He is allergic to tetracyclines & related..     Compared to one year ago, the patient feels his physical   health is worse.    Compared to one year ago, the patient feels his mental   health is the same.    Recent Hospitalizations:  He was not admitted to the hospital during the last year.       Current Medical Providers:  Patient Care Team:  Serg Johnson III, MD as PCP - General (Family Medicine)    Outpatient Medications Prior to Visit   Medication Sig Dispense Refill   • ARIPiprazole (ABILIFY) 10 MG tablet TAKE 2 TABLETS EVERY  tablet 0   • atorvastatin (LIPITOR) 20 MG tablet Take 1 tablet by mouth Daily. 90 tablet 3   • buPROPion XL (WELLBUTRIN XL) 300 MG 24 hr tablet TAKE 1 TABLET EVERY DAY (NEW DOSAGE)  90 tablet 3   • lamoTRIgine (LaMICtal) 150 MG tablet TAKE 1 TABLET TWICE DAILY 180 tablet 0   • PARoxetine CR (PAXIL-CR) 25 MG 24 hr tablet Take 2 tablets by mouth Daily. 180 tablet 0     No facility-administered medications prior to visit.       No opioid medication identified on active medication list. I have reviewed chart for other potential  high risk medication/s and harmful drug interactions in the elderly.          Aspirin is not on active medication list.  Aspirin use is not indicated based on review of current medical condition/s. Risk of harm outweighs potential benefits.  .    Patient Active Problem List   Diagnosis  "  • Anxiety and depression   • Bipolar 2 disorder (HCC)   • Encounter for long-term (current) use of other medications   • High cholesterol   • Primary localized osteoarthritis   • Tear of medial meniscus of right knee, subsequent encounter   • Tubular adenoma     Advance Care Planning  Advance Directive is not on file.  ACP discussion was held with the patient during this visit. Patient has an advance directive (not in EMR), copy requested.    REVIEW OF SYSTEMS  Cardiovascular no chest pain no palpitations patient does exercise discussed to have this fitted into a weight loss program  Respiratory no shortness of breath dyspnea on exertion  GI colonoscopy in 2021 patient had a tubular adenoma would be due again in 2026 discussed weight loss diet discussed plant-based diet  Psych no particular problem we will continue the Lamictal the bupropion the Abilify and the Paxil  Skill skeletal some right-sided low back pain not progressing not going down the leg  Objective       Vitals:    07/18/22 1550   BP: 120/60   Pulse: 75   Temp: 97.7 °F (36.5 °C)   SpO2: 97%   Weight: 102 kg (225 lb)   Height: 180.3 cm (71\")     BMI Readings from Last 1 Encounters:   07/18/22 31.38 kg/m²   BMI is above normal parameters. Recommendations include: educational material    Does the patient have evidence of cognitive impairment? No    PHYSICAL EXAM  General no distress  HEENT sclera conjunctiva clear TMs negative no oral lesions  Cardiovascular regular rhythm no murmur  Neck no adenopathy no thyroidomegaly  Lungs clear and equal bilaterally  Cardiovascular regular rhythm no murmur  Abdomen soft nontender no paraspinal megaly  Genitalia testicles are normal no hernia penis is normal  Rectal 1+ prostate hypertrophy no nodules no asymmetry  Neurologic mentation is normal speech is normal gait is normal  Musculoskeletal no specific crepitations of his knees good internal and external rotation of his hips either straight leg raise   "       HEALTH RISK ASSESSMENT    Smoking Status:  Social History     Tobacco Use   Smoking Status Never Smoker   Smokeless Tobacco Never Used     Alcohol Consumption:  Social History     Substance and Sexual Activity   Alcohol Use Yes   • Alcohol/week: 2.0 standard drinks   • Types: 1 Glasses of wine, 1 Shots of liquor per week    Comment: Drinks monthly; wine, beer and liquor     Fall Risk Screen:    SIM Fall Risk Assessment was completed, and patient is at LOW risk for falls.Assessment completed on:7/18/2022    Depression Screen:   PHQ-2/PHQ-9 Depression Screening 7/18/2022   Retired PHQ-9 Total Score -   Retired Total Score -   Little Interest or Pleasure in Doing Things 2-->more than half the days   Feeling Down, Depressed or Hopeless 0-->not at all   PHQ-9: Brief Depression Severity Measure Score 2       Health Habits and Functional and Cognitive Screening:  Functional & Cognitive Status 7/18/2022   Do you have difficulty preparing food and eating? No   Do you have difficulty bathing yourself, getting dressed or grooming yourself? No   Do you have difficulty using the toilet? No   Do you have difficulty moving around from place to place? No   Do you have trouble with steps or getting out of a bed or a chair? Yes   Current Diet Well Balanced Diet   Dental Exam Up to date   Eye Exam Up to date   Exercise (times per week) 5 times per week   Current Exercises Include Walking   Do you need help using the phone?  No   Are you deaf or do you have serious difficulty hearing?  No   Do you need help with transportation? No   Do you need help shopping? No   Do you need help preparing meals?  No   Do you need help with housework?  No   Do you need help with laundry? No   Do you need help taking your medications? No   Do you need help managing money? No   Do you ever drive or ride in a car without wearing a seat belt? No   Have you felt unusual stress, anger or loneliness in the last month? No   Who do you live with?  Spouse   If you need help, do you have trouble finding someone available to you? No   Have you been bothered in the last four weeks by sexual problems? No   Do you have difficulty concentrating, remembering or making decisions? No       Age-appropriate Screening Schedule:  Refer to the list below for future screening recommendations based on patient's age, sex and/or medical conditions. Orders for these recommended tests are listed in the plan section. The patient has been provided with a written plan.    Health Maintenance   Topic Date Due   • TDAP/TD VACCINES (1 - Tdap) Never done   • ZOSTER VACCINE (1 of 2) Never done   • LIPID PANEL  07/14/2022   • INFLUENZA VACCINE  10/01/2022            CMS Preventative Services Quick Reference  Risk Factors Identified During Encounter  Obesity/Overweight   The above risks/problems have been discussed with the patient.  Follow up actions/plans if indicated are seen below in the Assessment/Plan Section.  Pertinent information has been shared with the patient in the After Visit Summary.      Follow Up:  No follow-ups on file.     An After Visit Summary and PPPS were made available to the patient.      I spent 45 minutes caring for Reji on this date of service. This time includes time spent by me in the following activities:preparing for the visit, reviewing tests, obtaining and/or reviewing a separately obtained history, performing a medically appropriate examination and/or evaluation , counseling and educating the patient/family/caregiver, ordering medications, tests, or procedures, referring and communicating with other health care professionals , documenting information in the medical record, independently interpreting results and communicating that information with the patient/family/caregiver and care coordination    _________________________________________________________________________________________________--  Evaluation & Management    Chief Complaint  Medicare  Wellness-subsequent and Med Management    SUBJECTIVE  Reji Enriquez presents to [facility]  Patient 72 years old history of depression bipolar 2 hypercholesterolemia obesity tubular adenoma    PAST MEDICAL HISTORY  Allergies   Allergen Reactions   • Tetracyclines & Related Rash        Past Surgical History:   • COLONOSCOPY    2016   • ELBOW PROCEDURE   • FRACTURE SURGERY   • KIDNEY SURGERY   • OTHER SURGICAL HISTORY    Metal Implant   • VASECTOMY   • WISDOM TOOTH EXTRACTION    1996       Social History     Tobacco Use   • Smoking status: Never Smoker   • Smokeless tobacco: Never Used   Substance Use Topics   • Alcohol use: Yes     Alcohol/week: 2.0 standard drinks     Types: 1 Glasses of wine, 1 Shots of liquor per week     Comment: Drinks monthly; wine, beer and liquor       Family History   Problem Relation Age of Onset   • Diabetes Mother         [place] name   • Heart disease Father    • Urolithiasis Father    • Depression Maternal Grandfather    • Mental illness Sister         Bipolar        Health Maintenance Due   Topic Date Due   • TDAP/TD VACCINES (1 - Tdap) Never done   • ZOSTER VACCINE (1 of 2) Never done   • Pneumococcal Vaccine 65+ (2 - PPSV23 or PCV20) 12/04/2018   • LIPID PANEL  07/14/2022      Last Completed Colonoscopy          COLORECTAL CANCER SCREENING (COLONOSCOPY - Every 5 Years) Next due on 3/12/2026    04/22/2016  Outside Procedure: [place] COLONOSCOPY FLX DX W/COLLJ SPEC WHEN PFRMD                  ASSESSMENT & PLAN  Diagnoses and all orders for this visit:    1. Bipolar 2 disorder (HCC) (Primary)  -     Comprehensive Metabolic Panel; Future  -     TSH; Future  -     CBC & Differential; Future  -     POC Urinalysis Dipstick, Automated    2. Medicare annual wellness visit, subsequent  -     Lipid Panel; Future  -     Comprehensive Metabolic Panel; Future  -     TSH; Future  -     CBC & Differential; Future  -     POC Urinalysis Dipstick, Automated    3.  Laboratory exam ordered as part of routine general medical examination  -     Lipid Panel; Future  -     Comprehensive Metabolic Panel; Future  -     TSH; Future  -     CBC & Differential; Future  -     POC Urinalysis Dipstick, Automated    4. Anxiety and depression  -     Comprehensive Metabolic Panel; Future  -     TSH; Future  -     CBC & Differential; Future  -     POC Urinalysis Dipstick, Automated    5. High cholesterol  -     Lipid Panel; Future  -     Comprehensive Metabolic Panel; Future  -     TSH; Future  -     CBC & Differential; Future  -     POC Urinalysis Dipstick, Automated    6. Medication management  -     Lipid Panel; Future  -     Comprehensive Metabolic Panel; Future  -     TSH; Future  -     CBC & Differential; Future  -     POC Urinalysis Dipstick, Automated    7. Prostate cancer screening  -     PSA Screen; Future    8. Encounter for lipid screening for cardiovascular disease  -     Lipid Panel; Future      Bipolar depression doing reasonably well continue all meds hypercholesterolemia on Ativan needs a lipid and CMP obesity needs to lose 15 pounds tubular adenoma in 2021 needs a colonoscopy 2026            Patient was given instructions and counseling regarding his condition or for health maintenance advice. Please see specific information pulled into the AVS if appropriate.

## 2022-07-21 ENCOUNTER — LAB (OUTPATIENT)
Dept: LAB | Facility: HOSPITAL | Age: 72
End: 2022-07-21

## 2022-07-21 DIAGNOSIS — Z13.6 ENCOUNTER FOR LIPID SCREENING FOR CARDIOVASCULAR DISEASE: ICD-10-CM

## 2022-07-21 DIAGNOSIS — Z00.00 MEDICARE ANNUAL WELLNESS VISIT, SUBSEQUENT: ICD-10-CM

## 2022-07-21 DIAGNOSIS — F31.81 BIPOLAR 2 DISORDER: ICD-10-CM

## 2022-07-21 DIAGNOSIS — R73.09 ELEVATED GLUCOSE: Primary | ICD-10-CM

## 2022-07-21 DIAGNOSIS — Z00.00 LABORATORY EXAM ORDERED AS PART OF ROUTINE GENERAL MEDICAL EXAMINATION: ICD-10-CM

## 2022-07-21 DIAGNOSIS — Z12.5 PROSTATE CANCER SCREENING: ICD-10-CM

## 2022-07-21 DIAGNOSIS — E78.00 HIGH CHOLESTEROL: ICD-10-CM

## 2022-07-21 DIAGNOSIS — F41.9 ANXIETY AND DEPRESSION: ICD-10-CM

## 2022-07-21 DIAGNOSIS — Z79.899 MEDICATION MANAGEMENT: ICD-10-CM

## 2022-07-21 DIAGNOSIS — F32.A ANXIETY AND DEPRESSION: ICD-10-CM

## 2022-07-21 DIAGNOSIS — Z13.220 ENCOUNTER FOR LIPID SCREENING FOR CARDIOVASCULAR DISEASE: ICD-10-CM

## 2022-07-21 LAB
ALBUMIN SERPL-MCNC: 4.1 G/DL (ref 3.5–5.2)
ALBUMIN/GLOB SERPL: 1.4 G/DL
ALP SERPL-CCNC: 110 U/L (ref 39–117)
ALT SERPL W P-5'-P-CCNC: 39 U/L (ref 1–41)
ANION GAP SERPL CALCULATED.3IONS-SCNC: 10.5 MMOL/L (ref 5–15)
AST SERPL-CCNC: 22 U/L (ref 1–40)
BASOPHILS # BLD AUTO: 0.06 10*3/MM3 (ref 0–0.2)
BASOPHILS NFR BLD AUTO: 0.9 % (ref 0–1.5)
BILIRUB SERPL-MCNC: 0.5 MG/DL (ref 0–1.2)
BUN SERPL-MCNC: 16 MG/DL (ref 8–23)
BUN/CREAT SERPL: 13.2 (ref 7–25)
CALCIUM SPEC-SCNC: 9.2 MG/DL (ref 8.6–10.5)
CHLORIDE SERPL-SCNC: 105 MMOL/L (ref 98–107)
CHOLEST SERPL-MCNC: 131 MG/DL (ref 0–200)
CO2 SERPL-SCNC: 25.5 MMOL/L (ref 22–29)
CREAT SERPL-MCNC: 1.21 MG/DL (ref 0.76–1.27)
DEPRECATED RDW RBC AUTO: 41.4 FL (ref 37–54)
EGFRCR SERPLBLD CKD-EPI 2021: 64.4 ML/MIN/1.73
EOSINOPHIL # BLD AUTO: 0.2 10*3/MM3 (ref 0–0.4)
EOSINOPHIL NFR BLD AUTO: 3.2 % (ref 0.3–6.2)
ERYTHROCYTE [DISTWIDTH] IN BLOOD BY AUTOMATED COUNT: 12 % (ref 12.3–15.4)
GLOBULIN UR ELPH-MCNC: 3 GM/DL
GLUCOSE SERPL-MCNC: 144 MG/DL (ref 65–99)
HBA1C MFR BLD: 6.3 % (ref 4.8–5.6)
HCT VFR BLD AUTO: 41.4 % (ref 37.5–51)
HDLC SERPL-MCNC: 48 MG/DL (ref 40–60)
HGB BLD-MCNC: 14.3 G/DL (ref 13–17.7)
IMM GRANULOCYTES # BLD AUTO: 0.03 10*3/MM3 (ref 0–0.05)
IMM GRANULOCYTES NFR BLD AUTO: 0.5 % (ref 0–0.5)
LDLC SERPL CALC-MCNC: 61 MG/DL (ref 0–100)
LDLC/HDLC SERPL: 1.21 {RATIO}
LYMPHOCYTES # BLD AUTO: 1.82 10*3/MM3 (ref 0.7–3.1)
LYMPHOCYTES NFR BLD AUTO: 28.7 % (ref 19.6–45.3)
MCH RBC QN AUTO: 32.4 PG (ref 26.6–33)
MCHC RBC AUTO-ENTMCNC: 34.5 G/DL (ref 31.5–35.7)
MCV RBC AUTO: 93.9 FL (ref 79–97)
MONOCYTES # BLD AUTO: 0.77 10*3/MM3 (ref 0.1–0.9)
MONOCYTES NFR BLD AUTO: 12.1 % (ref 5–12)
NEUTROPHILS NFR BLD AUTO: 3.46 10*3/MM3 (ref 1.7–7)
NEUTROPHILS NFR BLD AUTO: 54.6 % (ref 42.7–76)
NRBC BLD AUTO-RTO: 0 /100 WBC (ref 0–0.2)
PLATELET # BLD AUTO: 255 10*3/MM3 (ref 140–450)
PMV BLD AUTO: 9.8 FL (ref 6–12)
POTASSIUM SERPL-SCNC: 4.3 MMOL/L (ref 3.5–5.2)
PROT SERPL-MCNC: 7.1 G/DL (ref 6–8.5)
PSA SERPL-MCNC: 1.02 NG/ML (ref 0–4)
RBC # BLD AUTO: 4.41 10*6/MM3 (ref 4.14–5.8)
SODIUM SERPL-SCNC: 141 MMOL/L (ref 136–145)
TRIGL SERPL-MCNC: 125 MG/DL (ref 0–150)
TSH SERPL DL<=0.05 MIU/L-ACNC: 1.86 UIU/ML (ref 0.27–4.2)
VLDLC SERPL-MCNC: 22 MG/DL (ref 5–40)
WBC NRBC COR # BLD: 6.34 10*3/MM3 (ref 3.4–10.8)

## 2022-07-21 PROCEDURE — G0103 PSA SCREENING: HCPCS

## 2022-07-21 PROCEDURE — 84443 ASSAY THYROID STIM HORMONE: CPT

## 2022-07-21 PROCEDURE — 36415 COLL VENOUS BLD VENIPUNCTURE: CPT

## 2022-07-21 PROCEDURE — 80061 LIPID PANEL: CPT

## 2022-07-21 PROCEDURE — 85025 COMPLETE CBC W/AUTO DIFF WBC: CPT

## 2022-07-21 PROCEDURE — 83036 HEMOGLOBIN GLYCOSYLATED A1C: CPT | Performed by: FAMILY MEDICINE

## 2022-07-21 PROCEDURE — 80053 COMPREHEN METABOLIC PANEL: CPT

## 2022-09-22 RX ORDER — BUPROPION HYDROCHLORIDE 300 MG/1
300 TABLET ORAL EVERY MORNING
Qty: 90 TABLET | Refills: 1 | Status: SHIPPED | OUTPATIENT
Start: 2022-09-22 | End: 2023-03-27

## 2022-11-01 ENCOUNTER — OFFICE VISIT (OUTPATIENT)
Dept: FAMILY MEDICINE CLINIC | Facility: CLINIC | Age: 72
End: 2022-11-01

## 2022-11-01 VITALS
TEMPERATURE: 97.8 F | OXYGEN SATURATION: 97 % | BODY MASS INDEX: 31.36 KG/M2 | WEIGHT: 224 LBS | SYSTOLIC BLOOD PRESSURE: 120 MMHG | HEART RATE: 66 BPM | DIASTOLIC BLOOD PRESSURE: 70 MMHG | HEIGHT: 71 IN

## 2022-11-01 DIAGNOSIS — G89.29 CHRONIC RIGHT-SIDED LOW BACK PAIN WITHOUT SCIATICA: ICD-10-CM

## 2022-11-01 DIAGNOSIS — R25.1 TREMOR OF BOTH HANDS: ICD-10-CM

## 2022-11-01 DIAGNOSIS — R73.09 ELEVATED GLUCOSE: Primary | ICD-10-CM

## 2022-11-01 DIAGNOSIS — M54.50 CHRONIC RIGHT-SIDED LOW BACK PAIN WITHOUT SCIATICA: ICD-10-CM

## 2022-11-01 PROCEDURE — 83036 HEMOGLOBIN GLYCOSYLATED A1C: CPT | Performed by: FAMILY MEDICINE

## 2022-11-01 PROCEDURE — 36415 COLL VENOUS BLD VENIPUNCTURE: CPT | Performed by: FAMILY MEDICINE

## 2022-11-01 PROCEDURE — 99213 OFFICE O/P EST LOW 20 MIN: CPT | Performed by: FAMILY MEDICINE

## 2022-11-01 NOTE — PROGRESS NOTES
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 discussed sitting discussed how to sit discussed using a flex spot test for 6 weeks to see if it increased his comfort and decreased his pain discussed not slump sitting gave her back sheet pain is not going down his leg    PAST MEDICAL HISTORY  Allergies   Allergen Reactions   • Tetracyclines & Related Rash        Past Surgical History:   • COLONOSCOPY    2016   • ELBOW PROCEDURE   • FRACTURE SURGERY   • KIDNEY SURGERY   • OTHER SURGICAL HISTORY    Metal Implant   • VASECTOMY   • WISDOM TOOTH EXTRACTION    1996       Social History     Tobacco Use   • Smoking status: Never   • Smokeless tobacco: Never   Substance Use Topics   • Alcohol use: Yes     Alcohol/week: 3.0 standard drinks     Types: 1 Glasses of wine, 1 Cans of beer, 1 Shots of liquor per week     Comment: Drinks monthly; wine, beer and liquor       Family History   Problem Relation Age of Onset   • Diabetes Mother         [place] name   • Heart disease Father    • Urolithiasis Father    • Depression Maternal Grandfather    • Mental illness Sister         Bipolar        Health Maintenance Due   Topic Date Due   • TDAP/TD VACCINES (1 - Tdap) Never done   • ZOSTER VACCINE (1 of 2) Never done   • Pneumococcal Vaccine 65+ (2 - PPSV23 if available, else PCV20) 12/04/2018        Last Completed Colonoscopy          COLORECTAL CANCER SCREENING (COLONOSCOPY - Every 5 Years) Next due on 3/12/2026    04/22/2016  Outside Procedure: [place] COLONOSCOPY FLX DX W/COLLJ SPEC WHEN PFRMD                REVIEW OF SYSTEMS    Skill skeletal right-sided low back pain has been worsening slightly since he is gotten his new  job where he is sitting more from the computer needs to get a flex spot desk gave  "her back sheet    OBJECTIVE  Vitals:    11/01/22 1451   BP: 120/70   Pulse: 66   Temp: 97.8 °F (36.6 °C)   SpO2: 97%   Weight: 102 kg (224 lb)   Height: 180.3 cm (71\")     Body mass index is 31.24 kg/m².    PHYSICAL EXAM    General no distress  Musculoskeletal negative straight leg raise increased pain lateral flexion to the right gait is normal no bradykinesia or rigidity very mild tremor this is essential tremor tremor is worse certain times of the day and if more fatigue  Neurologic very fine tremor both hands gait is normal speech is normal mentation is normal  ASSESSMENT & PLAN  Diagnoses and all orders for this visit:    1. Elevated glucose (Primary)  -     Hemoglobin A1c    2. Chronic right-sided low back pain without sciatica    3. Tremor of both hands          Musculoskeletal low back pain acetaminophen 652 twice daily flex spot desk back sheet do not slump sit recheck in 6 weeks #2 essential tremor reassurance can give medication if bothersome            Patient was given instructions and counseling regarding his condition or for health maintenance advice. Please see specific information pulled into the AVS if appropriate.   Answers for HPI/ROS submitted by the patient on 10/29/2022  What is the primary reason for your visit?: Other  Please describe your symptoms.: 1.  Hand tremors that are becoming progressively worse, although they do not manifest at all times., 2.  Lower left back above hip bone has been sore for months.  It can inhibit excersize, but it usually goes away with sleep overnight.  Have you had these symptoms before?: Yes  How long have you been having these symptoms?: Greater than 2 weeks  Please list any medications you are currently taking for this condition.: None for these complaints.  Please describe any probable cause for these symptoms. : Tremors:  years on medications prescribed for depression?  Simply age and tough it started so early in my case?  These have only become really " noticeable in the past couple of months., Lower back:  From a job  that required sitting for 8 to 10 hours per day over a period of three months?  That was over a year ago.

## 2022-11-02 LAB — HBA1C MFR BLD: 6.1 % (ref 4.8–5.6)

## 2022-11-03 DIAGNOSIS — F31.81 BIPOLAR 2 DISORDER: ICD-10-CM

## 2022-11-03 RX ORDER — ARIPIPRAZOLE 10 MG/1
TABLET ORAL
Qty: 180 TABLET | Refills: 1 | Status: SHIPPED | OUTPATIENT
Start: 2022-11-03

## 2023-01-12 DIAGNOSIS — F41.9 ANXIETY AND DEPRESSION: ICD-10-CM

## 2023-01-12 DIAGNOSIS — F32.A ANXIETY AND DEPRESSION: ICD-10-CM

## 2023-01-12 RX ORDER — PAROXETINE HYDROCHLORIDE HEMIHYDRATE 25 MG/1
TABLET, FILM COATED, EXTENDED RELEASE ORAL
Qty: 180 TABLET | Refills: 0 | Status: SHIPPED | OUTPATIENT
Start: 2023-01-12

## 2023-02-27 DIAGNOSIS — F31.81 BIPOLAR 2 DISORDER: ICD-10-CM

## 2023-02-27 RX ORDER — LAMOTRIGINE 150 MG/1
TABLET ORAL
Qty: 180 TABLET | Refills: 1 | Status: SHIPPED | OUTPATIENT
Start: 2023-02-27

## 2023-03-27 RX ORDER — BUPROPION HYDROCHLORIDE 300 MG/1
TABLET ORAL
Qty: 90 TABLET | Refills: 0 | Status: SHIPPED | OUTPATIENT
Start: 2023-03-27

## 2023-06-09 DIAGNOSIS — F41.9 ANXIETY AND DEPRESSION: ICD-10-CM

## 2023-06-09 DIAGNOSIS — F31.81 BIPOLAR 2 DISORDER: ICD-10-CM

## 2023-06-09 DIAGNOSIS — F32.A ANXIETY AND DEPRESSION: ICD-10-CM

## 2023-06-12 RX ORDER — ARIPIPRAZOLE 10 MG/1
TABLET ORAL
Qty: 180 TABLET | Refills: 0 | Status: SHIPPED | OUTPATIENT
Start: 2023-06-12

## 2023-06-12 RX ORDER — PAROXETINE HYDROCHLORIDE HEMIHYDRATE 25 MG/1
TABLET, FILM COATED, EXTENDED RELEASE ORAL
Qty: 180 TABLET | Refills: 0 | Status: SHIPPED | OUTPATIENT
Start: 2023-06-12

## 2023-08-24 DIAGNOSIS — F31.81 BIPOLAR 2 DISORDER: ICD-10-CM

## 2023-08-28 RX ORDER — LAMOTRIGINE 150 MG/1
TABLET ORAL
Qty: 180 TABLET | Refills: 0 | Status: SHIPPED | OUTPATIENT
Start: 2023-08-28

## 2023-09-18 RX ORDER — BUPROPION HYDROCHLORIDE 300 MG/1
TABLET ORAL
Qty: 90 TABLET | Refills: 0 | Status: SHIPPED | OUTPATIENT
Start: 2023-09-18

## 2023-11-09 DIAGNOSIS — F31.81 BIPOLAR 2 DISORDER: ICD-10-CM

## 2023-11-09 DIAGNOSIS — F41.9 ANXIETY AND DEPRESSION: ICD-10-CM

## 2023-11-09 DIAGNOSIS — F32.A ANXIETY AND DEPRESSION: ICD-10-CM

## 2023-11-09 RX ORDER — PAROXETINE HYDROCHLORIDE HEMIHYDRATE 25 MG/1
TABLET, FILM COATED, EXTENDED RELEASE ORAL
Qty: 180 TABLET | Refills: 10 | OUTPATIENT
Start: 2023-11-09

## 2023-11-09 RX ORDER — ARIPIPRAZOLE 10 MG/1
TABLET ORAL
Qty: 180 TABLET | Refills: 10 | OUTPATIENT
Start: 2023-11-09

## 2023-11-17 ENCOUNTER — OFFICE VISIT (OUTPATIENT)
Dept: SLEEP MEDICINE | Facility: HOSPITAL | Age: 73
End: 2023-11-17
Payer: MEDICARE

## 2023-11-17 VITALS
HEART RATE: 87 BPM | WEIGHT: 221 LBS | BODY MASS INDEX: 30.94 KG/M2 | DIASTOLIC BLOOD PRESSURE: 81 MMHG | OXYGEN SATURATION: 97 % | HEIGHT: 71 IN | SYSTOLIC BLOOD PRESSURE: 130 MMHG

## 2023-11-17 DIAGNOSIS — G47.33 OSA (OBSTRUCTIVE SLEEP APNEA): Primary | ICD-10-CM

## 2023-11-17 PROCEDURE — G0463 HOSPITAL OUTPT CLINIC VISIT: HCPCS

## 2023-11-17 RX ORDER — ATORVASTATIN CALCIUM 40 MG/1
40 TABLET, FILM COATED ORAL DAILY
COMMUNITY